# Patient Record
Sex: FEMALE | Race: WHITE | Employment: OTHER | URBAN - METROPOLITAN AREA
[De-identification: names, ages, dates, MRNs, and addresses within clinical notes are randomized per-mention and may not be internally consistent; named-entity substitution may affect disease eponyms.]

---

## 2022-07-22 ENCOUNTER — TELEPHONE (OUTPATIENT)
Dept: ORTHOPEDICS | Facility: CLINIC | Age: 77
End: 2022-07-22

## 2022-07-22 NOTE — TELEPHONE ENCOUNTER
153   7/22/22      Returned phone call with no answer- patients voicemail has not been set up.    We did not send over her medical records yet because the office that she is wanting them faxed to has not sent our office the release of information that she has signed yet.

## 2022-07-22 NOTE — TELEPHONE ENCOUNTER
----- Message from Jenifer Juárez sent at 7/22/2022  1:44 PM CDT -----  Regarding: Medical Record  Contact: patient  Per phone call with patient,she wanted to know if the medical records has been sent to Dr Lazar  office and his number is [phone].  The caller indicated that she did sign a release form.  Please return call at [phone].    Thanks,  SJ